# Patient Record
Sex: FEMALE | Race: WHITE | NOT HISPANIC OR LATINO | Employment: OTHER | URBAN - NONMETROPOLITAN AREA
[De-identification: names, ages, dates, MRNs, and addresses within clinical notes are randomized per-mention and may not be internally consistent; named-entity substitution may affect disease eponyms.]

---

## 2024-01-24 ENCOUNTER — OFFICE VISIT (OUTPATIENT)
Dept: NEUROSURGERY | Facility: CLINIC | Age: 69
End: 2024-01-24
Payer: MEDICARE

## 2024-01-24 ENCOUNTER — APPOINTMENT (OUTPATIENT)
Dept: OTHER | Facility: HOSPITAL | Age: 69
End: 2024-01-24
Payer: MEDICARE

## 2024-01-24 ENCOUNTER — HOSPITAL ENCOUNTER (OUTPATIENT)
Dept: GENERAL RADIOLOGY | Facility: HOSPITAL | Age: 69
Discharge: HOME | End: 2024-01-24
Payer: MEDICARE

## 2024-01-24 VITALS — WEIGHT: 177.6 LBS | BODY MASS INDEX: 34.87 KG/M2 | HEIGHT: 60 IN

## 2024-01-24 DIAGNOSIS — M46.1 SACROILIITIS: ICD-10-CM

## 2024-01-24 DIAGNOSIS — M47.12 CERVICAL SPONDYLOSIS WITH MYELOPATHY: ICD-10-CM

## 2024-01-24 DIAGNOSIS — Z78.9 NONSMOKER: ICD-10-CM

## 2024-01-24 DIAGNOSIS — M48.061 LUMBAR FORAMINAL STENOSIS: ICD-10-CM

## 2024-01-24 DIAGNOSIS — M47.12 CERVICAL SPONDYLOSIS WITH MYELOPATHY: Primary | ICD-10-CM

## 2024-01-24 DIAGNOSIS — M54.2 CERVICALGIA: ICD-10-CM

## 2024-01-24 DIAGNOSIS — E66.09 CLASS 1 OBESITY DUE TO EXCESS CALORIES WITHOUT SERIOUS COMORBIDITY WITH BODY MASS INDEX (BMI) OF 34.0 TO 34.9 IN ADULT: ICD-10-CM

## 2024-01-24 PROBLEM — E66.811 CLASS 1 OBESITY DUE TO EXCESS CALORIES WITHOUT SERIOUS COMORBIDITY WITH BODY MASS INDEX (BMI) OF 34.0 TO 34.9 IN ADULT: Status: ACTIVE | Noted: 2024-01-24

## 2024-01-24 PROCEDURE — 99204 OFFICE O/P NEW MOD 45 MIN: CPT | Performed by: PHYSICIAN ASSISTANT

## 2024-01-24 PROCEDURE — 72050 X-RAY EXAM NECK SPINE 4/5VWS: CPT

## 2024-01-24 RX ORDER — HYDROCHLOROTHIAZIDE 25 MG/1
25 TABLET ORAL DAILY
COMMUNITY

## 2024-01-24 RX ORDER — SUCRALFATE 1 G/1
TABLET ORAL EVERY 6 HOURS
COMMUNITY
Start: 2023-10-26

## 2024-01-24 RX ORDER — DICYCLOMINE HYDROCHLORIDE 10 MG/1
CAPSULE ORAL
COMMUNITY

## 2024-01-24 RX ORDER — FUROSEMIDE 20 MG/1
TABLET ORAL
COMMUNITY

## 2024-01-24 RX ORDER — FLUOXETINE HYDROCHLORIDE 20 MG/1
20 CAPSULE ORAL
COMMUNITY

## 2024-01-24 RX ORDER — BUSPIRONE HYDROCHLORIDE 15 MG/1
15 TABLET ORAL
COMMUNITY

## 2024-01-24 RX ORDER — ALPRAZOLAM 0.5 MG/1
TABLET ORAL
COMMUNITY
Start: 2023-12-04

## 2024-01-24 RX ORDER — SITAGLIPTIN 100 MG/1
1 TABLET, FILM COATED ORAL DAILY
COMMUNITY
Start: 2023-10-26

## 2024-01-24 RX ORDER — ATENOLOL 50 MG/1
50 TABLET ORAL DAILY
COMMUNITY

## 2024-01-24 RX ORDER — PREGABALIN 50 MG/1
50 CAPSULE ORAL 2 TIMES DAILY
Qty: 60 CAPSULE | Refills: 0 | Status: SHIPPED | OUTPATIENT
Start: 2024-01-24

## 2024-01-24 RX ORDER — FLUTICASONE PROPIONATE 50 MCG
SPRAY, SUSPENSION (ML) NASAL
COMMUNITY
Start: 2023-11-07

## 2024-01-24 RX ORDER — TRAZODONE HYDROCHLORIDE 50 MG/1
TABLET ORAL
COMMUNITY
Start: 2023-10-30

## 2024-01-24 RX ORDER — PANTOPRAZOLE SODIUM 40 MG/1
1 TABLET, DELAYED RELEASE ORAL DAILY
COMMUNITY
Start: 2023-10-26

## 2024-01-24 RX ORDER — RIVASTIGMINE TARTRATE 1.5 MG/1
CAPSULE ORAL
COMMUNITY
Start: 2024-01-01

## 2024-01-24 NOTE — PROGRESS NOTES
Chief complaint:   Chief Complaint   Patient presents with    Back Pain     Pt states having lower back pain and leg weakness and pain. Right shoulder pain.  Pain management Dr. Reilly and PT [facility]       Subjective     HPI: Monet comes in today with a 2-year history of low back pain with radiation down right greater than left posterior thighs terminating at the lateral ankles.  She also reports her legs feel weak and her right thigh wants to give way when she tries to step up on a step or a curb.  She has significant pain with sitting and also with standing and walking.  She has pain getting in and out of a car and when she rolls over in bed.  She also admits to some neurogenic claudication symptoms and reports weakness of her bilateral thighs with hip flexion.  She has been in pain management and had an epidural steroid injection which actually worsened her pain.  She also had complete numbness of her lower extremities that lasted for about an hour after the injection.  She had bilateral SI joint injections which provided almost near 100% pain relief in the SI joints as well as legs for duration of anesthetic and for about 3 weeks after.  She had history of L4-5 TLIF with Dr. Rodriguez about 10 years ago and did well after.    She also complains of some increasing posterior neck pain and right parascapular pain.  She has had physical therapy for both the shoulder and the low back without any symptom improvement.  Lately she has noticed the parascapular pain increases with cervical range of motion and she has had some clumsiness affecting both hands.  She states that her balance has been off for some time.  She has history of 4 cervical fusions all done by Dr. Holt in [place].    Review of Systems     History reviewed. No pertinent past medical history.  History reviewed. No pertinent surgical history.  History reviewed. No pertinent family history.  Social History     Tobacco Use    Smoking status: Never      "Passive exposure: Never    Smokeless tobacco: Never     (Not in a hospital admission)    Allergies:  Sulfa antibiotics    Objective      Vital Signs  Ht 152.4 cm (60\")   Wt 80.6 kg (177 lb 9.6 oz)   BMI 34.69 kg/m²     Physical Exam  Constitutional:       Appearance: Normal appearance. She is well-developed.   HENT:      Head: Normocephalic.   Eyes:      General: Lids are normal.      Conjunctiva/sclera: Conjunctivae normal.      Pupils: Pupils are equal, round, and reactive to light.   Neck:      Comments: + Spurling's on the right  There is tender cervical range of motion in all planes.  Pulmonary:      Effort: Pulmonary effort is normal.      Breath sounds: Normal breath sounds.   Musculoskeletal:         General: Tenderness (Bilateral SI joints are tender to palpation.  Hips are nontender to internal and external rotation.) present. Normal range of motion.   Skin:     General: Skin is warm and dry.   Neurological:      Mental Status: She is alert and oriented to person, place, and time.      GCS: GCS eye subscore is 4. GCS verbal subscore is 5. GCS motor subscore is 6.      Cranial Nerves: Cranial nerves 2-12 are intact. No cranial nerve deficit.      Sensory: No sensory deficit.      Motor: Weakness present.      Coordination: Coordination abnormal.      Gait: Gait abnormal and tandem walk abnormal.      Deep Tendon Reflexes: Reflexes are normal and symmetric. Reflexes normal.      Reflex Scores:       Tricep reflexes are 2+ on the right side and 2+ on the left side.       Bicep reflexes are 2+ on the right side and 2+ on the left side.       Brachioradialis reflexes are 2+ on the right side and 2+ on the left side.       Patellar reflexes are 2+ on the right side and 2+ on the left side.       Achilles reflexes are 2+ on the right side and 2+ on the left side.  Psychiatric:         Speech: Speech normal.         Behavior: Behavior normal.         Thought Content: Thought content normal.         Neurologic " Exam     Mental Status   Oriented to person, place, and time.   Speech: speech is normal   Level of consciousness: alert  Knowledge: good.     Cranial Nerves   Cranial nerves II through XII intact.     CN III, IV, VI   Pupils are equal, round, and reactive to light.    Motor Exam   Muscle bulk: decreased  Overall muscle tone: normal    Strength   Strength 5/5 except as noted. Mild right  and triceps weakness as well as bilateral deltoid weakness and bilateral iliopsoas weakness grade 4+/5     Sensory Exam   Light touch normal.     Gait, Coordination, and Reflexes     Coordination   Tandem walking coordination: abnormal    Reflexes   Right brachioradialis: 2+  Left brachioradialis: 2+  Right biceps: 2+  Left biceps: 2+  Right triceps: 2+  Left triceps: 2+  Right patellar: 2+  Left patellar: 2+  Right achilles: 2+  Left achilles: 2+  Right : 2+  Left : 2+  Right Costello: absent  Left Costello: absentTandem gait is grossly unstable       Imaging review: AP, lateral and oblique lumbar images were reviewed and demonstrate stable fusion changes L4-5.  There is severe disc degeneration L5-S1.  There is diffuse mild to moderate disc degeneration adjacent levels.  Hardware is in good position with no evidence of fracture.  No obvious listhesis.    MRI of the lumbar spine was reviewed and demonstrates prior fusion changes at L4-5.  There is severe disc degeneration L5-S1 with severe bilateral neuroforaminal stenosis.  No significant central stenosis.  There is facet ligament hypertrophy with moderate central stenosis at L3-4.    Assessment/Plan: I reviewed images in detail with Monet.  She has significant neuroforaminal stenosis bilaterally at L5-S1 and moderate central stenosis at L3-4.  She also has some symptoms consistent with sacroiliitis bilaterally and interestingly, SI joint injections helped her pain significantly and also alleviated her radicular pain.  On exam, she does demonstrate some upper extremity  weakness as well as bilateral iliopsoas weakness as well as an extremely unstable tandem gait and admits to hand dexterity issues.  Her symptoms are concerning for cervical myelopathy.    To better assess her issues, I would like to get an MRI of the cervical spine as well as plain cervical films today to evaluate hardware as well as for any instability.    I would like for her to get her MRI completed here with follow-up with Dr. Rodriguez the same day.    To assist with pain, I am giving her a trial of Lyrica 50 mg twice daily.    We discussed avoidance of aggravating activities as well as protective body mechanics.  She will call for sooner appointment should she develop any worsening in gait or weakness or increased pain.      Patient is a nonsmoker    The patient's Body mass index is 34.69 kg/m².. BMI is above normal parameters. Recommendations include: educational material    ADVANCED CARE PLANNING  Information on advance directives provided in AVS.    MANSI Fall Risk Assessment was completed, and patient is at MODERATE risk for falls. Assessment completed on:1/24/2024       Diagnoses and all orders for this visit:    1. Cervical spondylosis with myelopathy (Primary)  -     XR spine cervical ap and lat w flex and ext; Future  -     MRI Cervical Spine Without Contrast; Future  -     pregabalin (Lyrica) 50 MG capsule; Take 1 capsule by mouth 2 (Two) Times a Day.  Dispense: 60 capsule; Refill: 0    2. Cervicalgia    3. Sacroiliitis    4. Lumbar foraminal stenosis    5. Nonsmoker    6. Class 1 obesity due to excess calories without serious comorbidity with body mass index (BMI) of 34.0 to 34.9 in adult    Other orders  -     SCANNED - IMAGING  -     SCANNED EMG          I discussed the patients findings and my recommendations with patient    Roney Osorio PA-C  01/24/24  15:09 CST

## 2024-02-15 ENCOUNTER — HOSPITAL ENCOUNTER (OUTPATIENT)
Dept: MRI IMAGING | Facility: HOSPITAL | Age: 69
Discharge: HOME | End: 2024-02-15
Admitting: PHYSICIAN ASSISTANT
Payer: MEDICARE

## 2024-02-15 DIAGNOSIS — M47.12 CERVICAL SPONDYLOSIS WITH MYELOPATHY: ICD-10-CM

## 2024-02-15 PROCEDURE — 72141 MRI NECK SPINE W/O DYE: CPT

## 2024-02-26 ENCOUNTER — OFFICE VISIT (OUTPATIENT)
Dept: NEUROSURGERY | Facility: CLINIC | Age: 69
End: 2024-02-26
Payer: MEDICARE

## 2024-02-26 VITALS — BODY MASS INDEX: 35.14 KG/M2 | HEIGHT: 60 IN | WEIGHT: 179 LBS

## 2024-02-26 DIAGNOSIS — E66.09 CLASS 1 OBESITY DUE TO EXCESS CALORIES WITH SERIOUS COMORBIDITY AND BODY MASS INDEX (BMI) OF 34.0 TO 34.9 IN ADULT: Primary | ICD-10-CM

## 2024-02-26 DIAGNOSIS — Z78.9 NON-SMOKER: ICD-10-CM

## 2024-02-26 DIAGNOSIS — M47.12 CERVICAL SPONDYLOSIS WITH MYELOPATHY: ICD-10-CM

## 2024-02-26 PROCEDURE — 1159F MED LIST DOCD IN RCRD: CPT | Performed by: NEUROLOGICAL SURGERY

## 2024-02-26 PROCEDURE — 99214 OFFICE O/P EST MOD 30 MIN: CPT | Performed by: NEUROLOGICAL SURGERY

## 2024-02-26 PROCEDURE — 1160F RVW MEDS BY RX/DR IN RCRD: CPT | Performed by: NEUROLOGICAL SURGERY

## 2024-02-26 RX ORDER — PREGABALIN 50 MG/1
50 CAPSULE ORAL 2 TIMES DAILY
Qty: 60 CAPSULE | Refills: 0 | Status: SHIPPED | OUTPATIENT
Start: 2024-02-26

## 2024-02-26 RX ORDER — HYDROCODONE BITARTRATE AND ACETAMINOPHEN 7.5; 325 MG/1; MG/1
1 TABLET ORAL
COMMUNITY
Start: 2024-02-20

## 2024-02-26 NOTE — PROGRESS NOTES
"    Chief complaint:   Chief Complaint   Patient presents with    Follow-up     Follow up low back pain and bilateral leg weakness discuss MRi        Subjective     HPI: I had a chance to see Monet today in follow-up and to review her MRI of the lumbar spine.  She does have a successful fusion at L4/5 and does have degenerative disc disease above and below this fusion.  Clinically however I think her pain is more consistent with sacroiliitis than actual back pain as she has a lot of difficulty with sitting particularly in the car and is constantly leaning off 1 side to the other.  She also has had good response to sacroiliac joint injections in the past and really no significant response to epidurals.    Review of Systems      Objective      Vital Signs  Ht 152.4 cm (60\")   Wt 81.2 kg (179 lb)   BMI 34.96 kg/m²     Physical Exam  Neurological:      Mental Status: She is oriented to person, place, and time.      Cranial Nerves: Cranial nerves 2-12 are intact.      Motor: Motor strength is normal.     Gait: Gait is intact.   Psychiatric:         Speech: Speech normal.         Neurologic Exam     Mental Status   Oriented to person, place, and time.   Attention: normal. Concentration: normal.   Speech: speech is normal   Level of consciousness: alert  Knowledge: good.   Normal comprehension.     Cranial Nerves   Cranial nerves II through XII intact.     Motor Exam     Strength   Strength 5/5 throughout.     Sensory Exam   Light touch normal.     Gait, Coordination, and Reflexes     Gait  Gait: normal      Imaging review: MRI of the lumbar spine does show previous fusion L4/5 which appears intact.  There is significant degenerative changes above and below her fusion however I do not see any severe stenosis which would account for her pain.        Assessment/Plan:   Bilateral sacroiliitis    I would like to get Monet set up for bilateral diagnostic SI joint injections and I did  her on what to look for and to " do the things that normally cause her pain right after the injection to see if it is gone.  I would like to follow-up with her once the injections are complete.  I look forward to seeing her at her next visit.    Patient is a nonsmoker  The patient's Body mass index is 34.96 kg/m².. BMI is above normal parameters. Recommendations include: continue with current weight loss program    Diagnoses and all orders for this visit:    1. Class 1 obesity due to excess calories with serious comorbidity and body mass index (BMI) of 34.0 to 34.9 in adult (Primary)    2. Non-smoker    3. Cervical spondylosis with myelopathy  -     pregabalin (Lyrica) 50 MG capsule; Take 1 capsule by mouth 2 (Two) Times a Day.  Dispense: 60 capsule; Refill: 0        I discussed the patients findings and my recommendations with patient  Juwan Rodriguez DO  02/26/24  15:58 CST

## 2024-03-25 ENCOUNTER — OFFICE VISIT (OUTPATIENT)
Dept: NEUROSURGERY | Facility: CLINIC | Age: 69
End: 2024-03-25
Payer: MEDICARE

## 2024-03-25 VITALS — BODY MASS INDEX: 35.14 KG/M2 | WEIGHT: 179 LBS | HEIGHT: 60 IN

## 2024-03-25 DIAGNOSIS — Z78.9 NON-SMOKER: ICD-10-CM

## 2024-03-25 DIAGNOSIS — E66.09 CLASS 1 OBESITY DUE TO EXCESS CALORIES WITH SERIOUS COMORBIDITY AND BODY MASS INDEX (BMI) OF 34.0 TO 34.9 IN ADULT: Primary | ICD-10-CM

## 2024-03-25 DIAGNOSIS — M51.37 DEGENERATION OF LUMBAR OR LUMBOSACRAL INTERVERTEBRAL DISC: ICD-10-CM

## 2024-03-25 DIAGNOSIS — M48.061 LUMBAR FORAMINAL STENOSIS: ICD-10-CM

## 2024-03-25 PROCEDURE — 1160F RVW MEDS BY RX/DR IN RCRD: CPT | Performed by: NEUROLOGICAL SURGERY

## 2024-03-25 PROCEDURE — 99214 OFFICE O/P EST MOD 30 MIN: CPT | Performed by: NEUROLOGICAL SURGERY

## 2024-03-25 PROCEDURE — 1159F MED LIST DOCD IN RCRD: CPT | Performed by: NEUROLOGICAL SURGERY

## 2024-03-25 RX ORDER — PREGABALIN 100 MG/1
100 CAPSULE ORAL 2 TIMES DAILY
Qty: 180 CAPSULE | Refills: 0 | Status: SHIPPED | OUTPATIENT
Start: 2024-03-25 | End: 2024-06-23

## 2024-03-25 NOTE — PROGRESS NOTES
"    Chief complaint:   Chief Complaint   Patient presents with    Follow-up     2 month follow up low back pain and leg weakness after injections patient states legs are weak and giving out on her        Subjective     HPI: Had a chance to see Monet today in follow-up and to review her imaging studies of the lumbar spine.  Monet does have significant degeneration above and below her fusion however the changes at L3/4 are the most acute and most consistent with her thigh pain and shin pain both of which represent an L3 and L4 radiculopathy.  Clearly is suffering from neurogenic claudication and her degeneration L5/S1 has been present since before her lumbar fusion almost 10 years ago.    Review of Systems      Objective      Vital Signs  Ht 152.4 cm (60\")   Wt 81.2 kg (179 lb)   BMI 34.96 kg/m²     Physical Exam  Neurological:      Mental Status: She is oriented to person, place, and time.      Cranial Nerves: Cranial nerves 2-12 are intact.      Motor: Motor strength is normal.     Gait: Gait is intact.   Psychiatric:         Speech: Speech normal.         Neurologic Exam     Mental Status   Oriented to person, place, and time.   Attention: normal. Concentration: normal.   Speech: speech is normal   Level of consciousness: alert  Knowledge: good.   Normal comprehension.     Cranial Nerves   Cranial nerves II through XII intact.     Motor Exam     Strength   Strength 5/5 throughout.   Right peroneal: 4/5  Left peroneal: 4/5    Sensory Exam   Right leg light touch: decreased from knee  Left leg light touch: decreased from knee    Gait, Coordination, and Reflexes     Gait  Gait: normal      Imaging review: MRI of the lumbar spine shows previous fusion at L4/5 which appears to be fused.  There is significant degeneration L5/S1 with foraminal stenosis and there is also a disc bulge with significant foraminal stenosis at L3/4 compressing both exiting L3 nerve roots.        Assessment/Plan:   Severe L3 and L4 " radiculopathies with quadriceps weakness and dorsiflexion weakness.    Given the fact that the patient is not improving with injections or physical therapy I did offer her a transforaminal lumbar interbody fusion L3/4 to hopefully stabilize this level and open the foramen bilaterally as the severity of the foraminal stenosis is most consistent with her complaint.  I did discuss the risks and benefits of this procedure with her at length and she would like to proceed.  We will work on getting her medically cleared and on the operative schedule at our first opening.    Patient is a nonsmoker  The patient's Body mass index is 34.96 kg/m².. BMI is above normal parameters. Recommendations include: continue with current weight loss program    Diagnoses and all orders for this visit:    1. Class 1 obesity due to excess calories with serious comorbidity and body mass index (BMI) of 34.0 to 34.9 in adult (Primary)    2. Non-smoker    3. Lumbar foraminal stenosis  -     / Rigid Pre-Rodney LSO Order  -     pregabalin (LYRICA) 100 MG capsule; Take 1 capsule by mouth 2 (Two) Times a Day for 90 days.  Dispense: 180 capsule; Refill: 0    4. Degeneration of lumbar or lumbosacral intervertebral disc        I discussed the patients findings and my recommendations with patient  Juwan Rodriguez DO  03/25/24  13:46 CDT

## 2024-04-19 PROBLEM — M51.379 DEGENERATION OF LUMBAR OR LUMBOSACRAL INTERVERTEBRAL DISC: Status: ACTIVE | Noted: 2024-03-25

## 2024-04-19 PROBLEM — M51.37 DEGENERATION OF LUMBAR OR LUMBOSACRAL INTERVERTEBRAL DISC: Status: ACTIVE | Noted: 2024-03-25

## 2024-05-08 ENCOUNTER — TELEPHONE (OUTPATIENT)
Dept: NEUROSURGERY | Facility: CLINIC | Age: 69
End: 2024-05-08
Payer: MEDICARE

## 2025-06-20 ENCOUNTER — OFFICE VISIT (OUTPATIENT)
Dept: OBSTETRICS AND GYNECOLOGY | Age: 70
End: 2025-06-20
Payer: MEDICARE

## 2025-06-20 VITALS
SYSTOLIC BLOOD PRESSURE: 110 MMHG | BODY MASS INDEX: 33.53 KG/M2 | HEIGHT: 60 IN | DIASTOLIC BLOOD PRESSURE: 82 MMHG | WEIGHT: 170.8 LBS

## 2025-06-20 DIAGNOSIS — N81.4 CYSTOCELE WITH UTERINE PROLAPSE: ICD-10-CM

## 2025-06-20 DIAGNOSIS — R32 URINARY INCONTINENCE, UNSPECIFIED TYPE: ICD-10-CM

## 2025-06-20 DIAGNOSIS — Z76.89 ESTABLISHING CARE WITH NEW DOCTOR, ENCOUNTER FOR: Primary | ICD-10-CM

## 2025-06-20 NOTE — PROGRESS NOTES
Chief Complaint   Patient presents with    Bladder Prolapse     New patient referred by Gabby Lacey MD for cystocele with prolapse, onset a month ago. Reports some urinary and fecal incontinence with pt relates to a lumbar spine injury. No hx of pessary use in the past.        History:  Monet Ray is a 69 y.o. female who presents today for follow-up for evaluation of the above:    HPI  Patient presents today to [facility] care and with c/o prolapsed bladder for one month    History of back nerve pain  Issues with bowel control  Urinary leaking          ROS:  Review of Systems   Constitutional: Negative.    HENT: Negative.     Eyes: Negative.    Respiratory: Negative.     Cardiovascular: Negative.    Gastrointestinal: Negative.    Endocrine: Negative.    Genitourinary: Negative.    Musculoskeletal: Negative.    Skin: Negative.    Neurological: Negative.    Psychiatric/Behavioral: Negative.         Ms. Ray  reports that she has never smoked. She has never been exposed to tobacco smoke. She has never used smokeless tobacco. She reports that she does not drink alcohol and does not use drugs.      Current Outpatient Medications:     ALPRAZolam (XANAX) 0.5 MG tablet, , Disp: , Rfl:     atenolol (TENORMIN) 50 MG tablet, Take 1 tablet by mouth Daily., Disp: , Rfl:     busPIRone (BUSPAR) 15 MG tablet, Take 1 tablet by mouth., Disp: , Rfl:     FLUoxetine (PROzac) 20 MG capsule, Take 1 capsule by mouth., Disp: , Rfl:     fluticasone (FLONASE) 50 MCG/ACT nasal spray, , Disp: , Rfl:     furosemide (LASIX) 20 MG tablet, furosemide 20 mg tablet  TAKE ONE TABLET BY MOUTH TWICE A DAY, Disp: , Rfl:     HYDROcodone-acetaminophen (NORCO) 7.5-325 MG per tablet, Take 1 tablet by mouth. Twice a day, Disp: , Rfl:     metFORMIN (GLUCOPHAGE) 500 MG tablet, Take 1 tablet by mouth., Disp: , Rfl:     pantoprazole (PROTONIX) 40 MG EC tablet, Take 1 tablet by mouth Daily., Disp: , Rfl:     rivastigmine (EXELON) 1.5 MG capsule, , Disp:  ", Rfl:     sucralfate (CARAFATE) 1 g tablet, Take  by mouth Every 6 (Six) Hours., Disp: , Rfl:     traZODone (DESYREL) 50 MG tablet, , Disp: , Rfl:     dicyclomine (BENTYL) 10 MG capsule, dicyclomine 10 mg capsule (Patient not taking: Reported on 6/20/2025), Disp: , Rfl:     hydroCHLOROthiazide (HYDRODIURIL) 25 MG tablet, Take 1 tablet by mouth Daily. (Patient not taking: Reported on 6/20/2025), Disp: , Rfl:     Januvia 100 MG tablet, Take 1 tablet by mouth Daily. (Patient not taking: Reported on 6/20/2025), Disp: , Rfl:     pregabalin (LYRICA) 100 MG capsule, Take 1 capsule by mouth 2 (Two) Times a Day for 90 days., Disp: 180 capsule, Rfl: 0    pregabalin (Lyrica) 50 MG capsule, Take 1 capsule by mouth 2 (Two) Times a Day. (Patient not taking: Reported on 6/20/2025), Disp: 60 capsule, Rfl: 0      OBJECTIVE:  /82 (BP Location: Left arm, Patient Position: Sitting, Cuff Size: Adult)   Ht 152.4 cm (60\")   Wt 77.5 kg (170 lb 12.8 oz)   BMI 33.36 kg/m²    Physical Exam  Exam conducted with a chaperone present.   Constitutional:       Appearance: She is obese. She is not ill-appearing.   Pulmonary:      Effort: No respiratory distress.   Genitourinary:     Vagina: Prolapsed vaginal walls present.      Uterus: With uterine prolapse.       Comments: Grade 3 cystocele  Grade 2 uterine prolapse     Neurological:      Mental Status: She is oriented to person, place, and time.   Psychiatric:         Behavior: Behavior normal.         Assessment/Plan    Diagnoses and all orders for this visit:    1. Establishing care with new doctor, encounter for (Primary)    2. Cystocele with uterine prolapse    3. Urinary incontinence, unspecified type         An After Visit Summary was printed and given to the patient at discharge.  Return for pessary fitting 30 min visit.. Sooner if problems arise.          Della MARSH. 6/20/2025   Electronically Signed  "

## 2025-06-25 ENCOUNTER — TELEPHONE (OUTPATIENT)
Dept: OBSTETRICS AND GYNECOLOGY | Age: 70
End: 2025-06-25

## 2025-06-25 NOTE — TELEPHONE ENCOUNTER
Caller: Monet Ray    Relationship: Self    Best call back number:   Telephone Information:   Mobile [phone]       What is the best time to reach you: ANY    Who are you requesting to speak with (clinical staff, provider,  specific staff member):         What was the call regarding: PT CALLING TO CONFIRM SCHEDULE CHANGE TO 6.30.25. IT IS GOOD WITH HER.

## 2025-06-30 ENCOUNTER — OFFICE VISIT (OUTPATIENT)
Dept: OBSTETRICS AND GYNECOLOGY | Age: 70
End: 2025-06-30
Payer: MEDICARE

## 2025-06-30 VITALS
HEIGHT: 60 IN | SYSTOLIC BLOOD PRESSURE: 132 MMHG | DIASTOLIC BLOOD PRESSURE: 74 MMHG | BODY MASS INDEX: 33.38 KG/M2 | WEIGHT: 170 LBS

## 2025-06-30 DIAGNOSIS — R32 URINARY INCONTINENCE, UNSPECIFIED TYPE: ICD-10-CM

## 2025-06-30 DIAGNOSIS — Z46.89 ENCOUNTER FOR FITTING AND ADJUSTMENT OF PESSARY: Primary | ICD-10-CM

## 2025-06-30 DIAGNOSIS — N81.4 CYSTOCELE WITH UTERINE PROLAPSE: ICD-10-CM

## 2025-06-30 RX ORDER — MECLIZINE HYDROCHLORIDE 25 MG/1
12.5 TABLET ORAL 3 TIMES DAILY PRN
COMMUNITY

## 2025-06-30 RX ORDER — ONDANSETRON 4 MG/1
4 TABLET, FILM COATED ORAL EVERY 8 HOURS PRN
COMMUNITY

## 2025-06-30 RX ORDER — GABAPENTIN 300 MG/1
300 CAPSULE ORAL 3 TIMES DAILY
COMMUNITY
Start: 2025-03-14

## 2025-06-30 NOTE — PROGRESS NOTES
"Monet Ray is a 69 y.o. female here today for pessary fitting for treatment of cystocele with uterine prolapse    Visit Vitals  /74   Ht 152.4 cm (60\")   Wt 77.1 kg (170 lb)   BMI 33.20 kg/m²     Pleasant female no acute distress  Mood and affect normal  Breathing unlabored    A chaperone is present on exam today.   The vaginal length and caliber were estimated by digital exam.  Initially a #3 ring pessary was selected and placed.   The patient was allowed to ambulate, Valsalva, and perform normal daily activities.  There was no discomfort or expulsion of the pessary.  The fitting pessary was removed and a permanent #3 ring pessary with support placed.       Diagnosis Plan   1. Encounter for fitting and adjustment of pessary        2. Cystocele with uterine prolapse        3. Urinary incontinence, unspecified type             I given her instructions on management of a pessary.  We discussed how to cleanse and replace it should it be expelled.  We discussed warning signs of vaginal bleeding, discharge, or urinary retention.  She will follow-up in 3 to 4 weeks to see how her symptom control is going with the pessary.  In the meantime if she has questions or concerns she will contact the office.    Della MARSH 6/30/2025  Electronically signed  "

## 2025-07-01 ENCOUNTER — TELEPHONE (OUTPATIENT)
Dept: OBSTETRICS AND GYNECOLOGY | Age: 70
End: 2025-07-01
Payer: MEDICARE

## 2025-07-01 NOTE — TELEPHONE ENCOUNTER
Pt calling to report today while using restroom she has felt pessary bulging in vaginal canal. Pt is asking if she should remove pessary or continue pushing back into place. Spoke with SALMA lord and she advised to continue to push pessary in place until f/u appt scheduled 7/11. Pt advised and reports she is comfortable reinserting pessary if completely falls out. Pt advised this is appropriate but with any additional questions or concerns to return call to office for evaluation. Pt voices understanding.    None